# Patient Record
Sex: MALE | Race: OTHER | URBAN - METROPOLITAN AREA
[De-identification: names, ages, dates, MRNs, and addresses within clinical notes are randomized per-mention and may not be internally consistent; named-entity substitution may affect disease eponyms.]

---

## 2021-11-02 ENCOUNTER — INPATIENT (INPATIENT)
Facility: HOSPITAL | Age: 51
LOS: 0 days | DRG: 64 | End: 2021-11-03
Attending: PSYCHIATRY & NEUROLOGY | Admitting: PSYCHIATRY & NEUROLOGY
Payer: MEDICAID

## 2021-11-02 ENCOUNTER — EMERGENCY (EMERGENCY)
Facility: HOSPITAL | Age: 51
LOS: 1 days | Discharge: ANOTHER HOSPITAL | End: 2021-11-02
Attending: EMERGENCY MEDICINE
Payer: MEDICAID

## 2021-11-02 VITALS
DIASTOLIC BLOOD PRESSURE: 68 MMHG | WEIGHT: 178.13 LBS | SYSTOLIC BLOOD PRESSURE: 136 MMHG | RESPIRATION RATE: 18 BRPM | HEART RATE: 54 BPM | TEMPERATURE: 94 F | OXYGEN SATURATION: 100 %

## 2021-11-02 VITALS
OXYGEN SATURATION: 100 % | SYSTOLIC BLOOD PRESSURE: 134 MMHG | RESPIRATION RATE: 14 BRPM | DIASTOLIC BLOOD PRESSURE: 67 MMHG | HEART RATE: 56 BPM

## 2021-11-02 VITALS — WEIGHT: 171.96 LBS

## 2021-11-02 DIAGNOSIS — R56.9 UNSPECIFIED CONVULSIONS: ICD-10-CM

## 2021-11-02 LAB
ACETONE SERPL-MCNC: NEGATIVE — SIGNIFICANT CHANGE UP
ALBUMIN SERPL ELPH-MCNC: 3.6 G/DL — SIGNIFICANT CHANGE UP (ref 3.5–5)
ALP SERPL-CCNC: 789 U/L — HIGH (ref 40–120)
ALT FLD-CCNC: 19 U/L DA — SIGNIFICANT CHANGE UP (ref 10–60)
AMMONIA BLD-MCNC: 23 UMOL/L — SIGNIFICANT CHANGE UP (ref 11–32)
ANION GAP SERPL CALC-SCNC: 10 MMOL/L — SIGNIFICANT CHANGE UP (ref 5–17)
ANION GAP SERPL CALC-SCNC: 14 MMOL/L — SIGNIFICANT CHANGE UP (ref 5–17)
APTT BLD: 37.7 SEC — HIGH (ref 27.5–35.5)
APTT BLD: 41.2 SEC — HIGH (ref 27.5–35.5)
AST SERPL-CCNC: 8 U/L — LOW (ref 10–40)
BASE EXCESS BLDA CALC-SCNC: -0.4 MMOL/L — SIGNIFICANT CHANGE UP (ref -2–3)
BASOPHILS # BLD AUTO: 0.01 K/UL — SIGNIFICANT CHANGE UP (ref 0–0.2)
BASOPHILS NFR BLD AUTO: 0.2 % — SIGNIFICANT CHANGE UP (ref 0–2)
BILIRUB SERPL-MCNC: 1 MG/DL — SIGNIFICANT CHANGE UP (ref 0.2–1.2)
BLD GP AB SCN SERPL QL: NEGATIVE — SIGNIFICANT CHANGE UP
BUN SERPL-MCNC: 27 MG/DL — HIGH (ref 7–18)
BUN SERPL-MCNC: 32 MG/DL — HIGH (ref 7–23)
CALCIUM SERPL-MCNC: 9 MG/DL — SIGNIFICANT CHANGE UP (ref 8.4–10.5)
CALCIUM SERPL-MCNC: 9.3 MG/DL — SIGNIFICANT CHANGE UP (ref 8.4–10.5)
CHLORIDE SERPL-SCNC: 104 MMOL/L — SIGNIFICANT CHANGE UP (ref 96–108)
CHLORIDE SERPL-SCNC: 104 MMOL/L — SIGNIFICANT CHANGE UP (ref 96–108)
CHOLEST SERPL-MCNC: 153 MG/DL — SIGNIFICANT CHANGE UP
CK SERPL-CCNC: 74 U/L — SIGNIFICANT CHANGE UP (ref 35–232)
CO2 BLDA-SCNC: 24 MMOL/L — SIGNIFICANT CHANGE UP (ref 19–24)
CO2 SERPL-SCNC: 22 MMOL/L — SIGNIFICANT CHANGE UP (ref 22–31)
CO2 SERPL-SCNC: 26 MMOL/L — SIGNIFICANT CHANGE UP (ref 22–31)
CREAT SERPL-MCNC: 5.9 MG/DL — HIGH (ref 0.5–1.3)
CREAT SERPL-MCNC: 6.46 MG/DL — HIGH (ref 0.5–1.3)
EOSINOPHIL # BLD AUTO: 0.04 K/UL — SIGNIFICANT CHANGE UP (ref 0–0.5)
EOSINOPHIL NFR BLD AUTO: 0.9 % — SIGNIFICANT CHANGE UP (ref 0–6)
ETHANOL SERPL-MCNC: <3 MG/DL — SIGNIFICANT CHANGE UP (ref 0–10)
GAS PNL BLDA: SIGNIFICANT CHANGE UP
GLUCOSE SERPL-MCNC: 116 MG/DL — HIGH (ref 70–99)
GLUCOSE SERPL-MCNC: 166 MG/DL — HIGH (ref 70–99)
HCO3 BLDA-SCNC: 23 MMOL/L — SIGNIFICANT CHANGE UP (ref 21–28)
HCT VFR BLD CALC: 25.3 % — LOW (ref 39–50)
HCT VFR BLD CALC: 26.3 % — LOW (ref 39–50)
HDLC SERPL-MCNC: 45 MG/DL — SIGNIFICANT CHANGE UP
HGB BLD-MCNC: 8.1 G/DL — LOW (ref 13–17)
HGB BLD-MCNC: 8.5 G/DL — LOW (ref 13–17)
HOROWITZ INDEX BLDA+IHG-RTO: 50 — SIGNIFICANT CHANGE UP
IMM GRANULOCYTES NFR BLD AUTO: 0.2 % — SIGNIFICANT CHANGE UP (ref 0–1.5)
INR BLD: 1.12 RATIO — SIGNIFICANT CHANGE UP (ref 0.88–1.16)
INR BLD: 1.13 RATIO — SIGNIFICANT CHANGE UP (ref 0.88–1.16)
LACTATE SERPL-SCNC: 0.9 MMOL/L — SIGNIFICANT CHANGE UP (ref 0.7–2)
LIPID PNL WITH DIRECT LDL SERPL: 82 MG/DL — SIGNIFICANT CHANGE UP
LYMPHOCYTES # BLD AUTO: 1.02 K/UL — SIGNIFICANT CHANGE UP (ref 1–3.3)
LYMPHOCYTES # BLD AUTO: 23.6 % — SIGNIFICANT CHANGE UP (ref 13–44)
MAGNESIUM SERPL-MCNC: 2.2 MG/DL — SIGNIFICANT CHANGE UP (ref 1.6–2.6)
MAGNESIUM SERPL-MCNC: 2.3 MG/DL — SIGNIFICANT CHANGE UP (ref 1.6–2.6)
MCHC RBC-ENTMCNC: 31 PG — SIGNIFICANT CHANGE UP (ref 27–34)
MCHC RBC-ENTMCNC: 31.6 PG — SIGNIFICANT CHANGE UP (ref 27–34)
MCHC RBC-ENTMCNC: 32 GM/DL — SIGNIFICANT CHANGE UP (ref 32–36)
MCHC RBC-ENTMCNC: 32.3 GM/DL — SIGNIFICANT CHANGE UP (ref 32–36)
MCV RBC AUTO: 96.9 FL — SIGNIFICANT CHANGE UP (ref 80–100)
MCV RBC AUTO: 97.8 FL — SIGNIFICANT CHANGE UP (ref 80–100)
MONOCYTES # BLD AUTO: 0.46 K/UL — SIGNIFICANT CHANGE UP (ref 0–0.9)
MONOCYTES NFR BLD AUTO: 10.6 % — SIGNIFICANT CHANGE UP (ref 2–14)
NEUTROPHILS # BLD AUTO: 2.79 K/UL — SIGNIFICANT CHANGE UP (ref 1.8–7.4)
NEUTROPHILS NFR BLD AUTO: 64.5 % — SIGNIFICANT CHANGE UP (ref 43–77)
NON HDL CHOLESTEROL: 108 MG/DL — SIGNIFICANT CHANGE UP
NRBC # BLD: 0 /100 WBCS — SIGNIFICANT CHANGE UP (ref 0–0)
NRBC # BLD: 0 /100 WBCS — SIGNIFICANT CHANGE UP (ref 0–0)
PCO2 BLDA: 33 MMHG — LOW (ref 35–48)
PH BLDA: 7.45 — SIGNIFICANT CHANGE UP (ref 7.35–7.45)
PHOSPHATE SERPL-MCNC: 4.7 MG/DL — HIGH (ref 2.5–4.5)
PLATELET # BLD AUTO: 170 K/UL — SIGNIFICANT CHANGE UP (ref 150–400)
PLATELET # BLD AUTO: 187 K/UL — SIGNIFICANT CHANGE UP (ref 150–400)
PO2 BLDA: 121 MMHG — HIGH (ref 83–108)
POTASSIUM SERPL-MCNC: 3.7 MMOL/L — SIGNIFICANT CHANGE UP (ref 3.5–5.3)
POTASSIUM SERPL-MCNC: 4.6 MMOL/L — SIGNIFICANT CHANGE UP (ref 3.5–5.3)
POTASSIUM SERPL-SCNC: 3.7 MMOL/L — SIGNIFICANT CHANGE UP (ref 3.5–5.3)
POTASSIUM SERPL-SCNC: 4.6 MMOL/L — SIGNIFICANT CHANGE UP (ref 3.5–5.3)
PROT SERPL-MCNC: 8.3 G/DL — SIGNIFICANT CHANGE UP (ref 6–8.3)
PROTHROM AB SERPL-ACNC: 13.2 SEC — SIGNIFICANT CHANGE UP (ref 10.6–13.6)
PROTHROM AB SERPL-ACNC: 13.5 SEC — SIGNIFICANT CHANGE UP (ref 10.6–13.6)
RBC # BLD: 2.61 M/UL — LOW (ref 4.2–5.8)
RBC # BLD: 2.69 M/UL — LOW (ref 4.2–5.8)
RBC # FLD: 13.2 % — SIGNIFICANT CHANGE UP (ref 10.3–14.5)
RBC # FLD: 13.2 % — SIGNIFICANT CHANGE UP (ref 10.3–14.5)
RH IG SCN BLD-IMP: POSITIVE — SIGNIFICANT CHANGE UP
SAO2 % BLDA: 99.9 % — HIGH (ref 94–98)
SARS-COV-2 RNA SPEC QL NAA+PROBE: SIGNIFICANT CHANGE UP
SODIUM SERPL-SCNC: 140 MMOL/L — SIGNIFICANT CHANGE UP (ref 135–145)
SODIUM SERPL-SCNC: 140 MMOL/L — SIGNIFICANT CHANGE UP (ref 135–145)
TRIGL SERPL-MCNC: 132 MG/DL — SIGNIFICANT CHANGE UP
TROPONIN I, HIGH SENSITIVITY RESULT: 41.5 NG/L — SIGNIFICANT CHANGE UP
TROPONIN T, HIGH SENSITIVITY RESULT: 41 NG/L — SIGNIFICANT CHANGE UP (ref 0–51)
WBC # BLD: 4.33 K/UL — SIGNIFICANT CHANGE UP (ref 3.8–10.5)
WBC # BLD: 5.78 K/UL — SIGNIFICANT CHANGE UP (ref 3.8–10.5)
WBC # FLD AUTO: 4.33 K/UL — SIGNIFICANT CHANGE UP (ref 3.8–10.5)
WBC # FLD AUTO: 5.78 K/UL — SIGNIFICANT CHANGE UP (ref 3.8–10.5)

## 2021-11-02 PROCEDURE — 71045 X-RAY EXAM CHEST 1 VIEW: CPT

## 2021-11-02 PROCEDURE — 96374 THER/PROPH/DIAG INJ IV PUSH: CPT

## 2021-11-02 PROCEDURE — 82962 GLUCOSE BLOOD TEST: CPT

## 2021-11-02 PROCEDURE — 70450 CT HEAD/BRAIN W/O DYE: CPT | Mod: 26,MA

## 2021-11-02 PROCEDURE — 85025 COMPLETE CBC W/AUTO DIFF WBC: CPT

## 2021-11-02 PROCEDURE — 82550 ASSAY OF CK (CPK): CPT

## 2021-11-02 PROCEDURE — 70450 CT HEAD/BRAIN W/O DYE: CPT | Mod: 26,77

## 2021-11-02 PROCEDURE — 80307 DRUG TEST PRSMV CHEM ANLYZR: CPT

## 2021-11-02 PROCEDURE — 71045 X-RAY EXAM CHEST 1 VIEW: CPT | Mod: 26

## 2021-11-02 PROCEDURE — 87635 SARS-COV-2 COVID-19 AMP PRB: CPT

## 2021-11-02 PROCEDURE — 80061 LIPID PANEL: CPT

## 2021-11-02 PROCEDURE — 99291 CRITICAL CARE FIRST HOUR: CPT | Mod: 25

## 2021-11-02 PROCEDURE — 83735 ASSAY OF MAGNESIUM: CPT

## 2021-11-02 PROCEDURE — 70450 CT HEAD/BRAIN W/O DYE: CPT | Mod: MA

## 2021-11-02 PROCEDURE — 93005 ELECTROCARDIOGRAM TRACING: CPT

## 2021-11-02 PROCEDURE — 43752 NASAL/OROGASTRIC W/TUBE PLMT: CPT | Mod: 59

## 2021-11-02 PROCEDURE — 84484 ASSAY OF TROPONIN QUANT: CPT

## 2021-11-02 PROCEDURE — 96375 TX/PRO/DX INJ NEW DRUG ADDON: CPT

## 2021-11-02 PROCEDURE — 82140 ASSAY OF AMMONIA: CPT

## 2021-11-02 PROCEDURE — 36556 INSERT NON-TUNNEL CV CATH: CPT

## 2021-11-02 PROCEDURE — 36415 COLL VENOUS BLD VENIPUNCTURE: CPT

## 2021-11-02 PROCEDURE — 82009 KETONE BODYS QUAL: CPT

## 2021-11-02 PROCEDURE — 93010 ELECTROCARDIOGRAM REPORT: CPT

## 2021-11-02 PROCEDURE — 99291 CRITICAL CARE FIRST HOUR: CPT

## 2021-11-02 PROCEDURE — 31500 INSERT EMERGENCY AIRWAY: CPT

## 2021-11-02 PROCEDURE — 71045 X-RAY EXAM CHEST 1 VIEW: CPT | Mod: 26,77

## 2021-11-02 PROCEDURE — 85730 THROMBOPLASTIN TIME PARTIAL: CPT

## 2021-11-02 PROCEDURE — 83605 ASSAY OF LACTIC ACID: CPT

## 2021-11-02 PROCEDURE — 85610 PROTHROMBIN TIME: CPT

## 2021-11-02 PROCEDURE — 80053 COMPREHEN METABOLIC PANEL: CPT

## 2021-11-02 RX ORDER — HYDRALAZINE HCL 50 MG
5 TABLET ORAL ONCE
Refills: 0 | Status: COMPLETED | OUTPATIENT
Start: 2021-11-02 | End: 2021-11-02

## 2021-11-02 RX ORDER — CHLORHEXIDINE GLUCONATE 213 G/1000ML
15 SOLUTION TOPICAL EVERY 12 HOURS
Refills: 0 | Status: DISCONTINUED | OUTPATIENT
Start: 2021-11-02 | End: 2021-11-06

## 2021-11-02 RX ORDER — CHLORHEXIDINE GLUCONATE 213 G/1000ML
1 SOLUTION TOPICAL
Refills: 0 | Status: DISCONTINUED | OUTPATIENT
Start: 2021-11-02 | End: 2021-11-03

## 2021-11-02 RX ORDER — NICARDIPINE HYDROCHLORIDE 30 MG/1
5 CAPSULE, EXTENDED RELEASE ORAL
Qty: 40 | Refills: 0 | Status: DISCONTINUED | OUTPATIENT
Start: 2021-11-02 | End: 2021-11-06

## 2021-11-02 RX ORDER — LEVETIRACETAM 250 MG/1
1000 TABLET, FILM COATED ORAL ONCE
Refills: 0 | Status: COMPLETED | OUTPATIENT
Start: 2021-11-02 | End: 2021-11-02

## 2021-11-02 RX ORDER — LEVETIRACETAM 250 MG/1
250 TABLET, FILM COATED ORAL EVERY 12 HOURS
Refills: 0 | Status: DISCONTINUED | OUTPATIENT
Start: 2021-11-02 | End: 2021-11-03

## 2021-11-02 RX ORDER — SODIUM CHLORIDE 9 MG/ML
30 INJECTION INTRAMUSCULAR; INTRAVENOUS; SUBCUTANEOUS ONCE
Refills: 0 | Status: COMPLETED | OUTPATIENT
Start: 2021-11-02 | End: 2021-11-02

## 2021-11-02 RX ORDER — NICARDIPINE HYDROCHLORIDE 30 MG/1
15 CAPSULE, EXTENDED RELEASE ORAL
Qty: 40 | Refills: 0 | Status: DISCONTINUED | OUTPATIENT
Start: 2021-11-02 | End: 2021-11-03

## 2021-11-02 RX ORDER — PANTOPRAZOLE SODIUM 20 MG/1
40 TABLET, DELAYED RELEASE ORAL ONCE
Refills: 0 | Status: COMPLETED | OUTPATIENT
Start: 2021-11-02 | End: 2021-11-02

## 2021-11-02 RX ORDER — MIDAZOLAM HYDROCHLORIDE 1 MG/ML
2 INJECTION, SOLUTION INTRAMUSCULAR; INTRAVENOUS ONCE
Refills: 0 | Status: DISCONTINUED | OUTPATIENT
Start: 2021-11-02 | End: 2021-11-02

## 2021-11-02 RX ORDER — SODIUM CHLORIDE 9 MG/ML
10 INJECTION INTRAMUSCULAR; INTRAVENOUS; SUBCUTANEOUS
Refills: 0 | Status: DISCONTINUED | OUTPATIENT
Start: 2021-11-02 | End: 2021-11-03

## 2021-11-02 RX ORDER — ETOMIDATE 2 MG/ML
10 INJECTION INTRAVENOUS ONCE
Refills: 0 | Status: COMPLETED | OUTPATIENT
Start: 2021-11-02 | End: 2021-11-02

## 2021-11-02 RX ORDER — PROPOFOL 10 MG/ML
5 INJECTION, EMULSION INTRAVENOUS
Qty: 500 | Refills: 0 | Status: DISCONTINUED | OUTPATIENT
Start: 2021-11-02 | End: 2021-11-06

## 2021-11-02 RX ORDER — SENNA PLUS 8.6 MG/1
2 TABLET ORAL AT BEDTIME
Refills: 0 | Status: DISCONTINUED | OUTPATIENT
Start: 2021-11-02 | End: 2021-11-03

## 2021-11-02 RX ORDER — PANTOPRAZOLE SODIUM 20 MG/1
40 TABLET, DELAYED RELEASE ORAL DAILY
Refills: 0 | Status: DISCONTINUED | OUTPATIENT
Start: 2021-11-02 | End: 2021-11-03

## 2021-11-02 RX ORDER — CHLORHEXIDINE GLUCONATE 213 G/1000ML
15 SOLUTION TOPICAL EVERY 12 HOURS
Refills: 0 | Status: DISCONTINUED | OUTPATIENT
Start: 2021-11-02 | End: 2021-11-03

## 2021-11-02 RX ADMIN — PANTOPRAZOLE SODIUM 40 MILLIGRAM(S): 20 TABLET, DELAYED RELEASE ORAL at 17:56

## 2021-11-02 RX ADMIN — MIDAZOLAM HYDROCHLORIDE 2 MILLIGRAM(S): 1 INJECTION, SOLUTION INTRAMUSCULAR; INTRAVENOUS at 13:28

## 2021-11-02 RX ADMIN — PROPOFOL 2.34 MICROGRAM(S)/KG/MIN: 10 INJECTION, EMULSION INTRAVENOUS at 15:06

## 2021-11-02 RX ADMIN — Medication 2 MILLIGRAM(S): at 12:53

## 2021-11-02 RX ADMIN — NICARDIPINE HYDROCHLORIDE 75 MG/HR: 30 CAPSULE, EXTENDED RELEASE ORAL at 20:03

## 2021-11-02 RX ADMIN — SODIUM CHLORIDE 30 MILLILITER(S): 9 INJECTION INTRAMUSCULAR; INTRAVENOUS; SUBCUTANEOUS at 16:58

## 2021-11-02 RX ADMIN — Medication 5 MILLIGRAM(S): at 16:20

## 2021-11-02 RX ADMIN — NICARDIPINE HYDROCHLORIDE 75 MG/HR: 30 CAPSULE, EXTENDED RELEASE ORAL at 17:57

## 2021-11-02 RX ADMIN — SODIUM CHLORIDE 30 MILLILITER(S): 9 INJECTION INTRAMUSCULAR; INTRAVENOUS; SUBCUTANEOUS at 16:04

## 2021-11-02 RX ADMIN — PANTOPRAZOLE SODIUM 40 MILLIGRAM(S): 20 TABLET, DELAYED RELEASE ORAL at 13:28

## 2021-11-02 RX ADMIN — LEVETIRACETAM 400 MILLIGRAM(S): 250 TABLET, FILM COATED ORAL at 18:29

## 2021-11-02 RX ADMIN — NICARDIPINE HYDROCHLORIDE 25 MG/HR: 30 CAPSULE, EXTENDED RELEASE ORAL at 13:25

## 2021-11-02 RX ADMIN — ETOMIDATE 10 MILLIGRAM(S): 2 INJECTION INTRAVENOUS at 13:28

## 2021-11-02 RX ADMIN — CHLORHEXIDINE GLUCONATE 15 MILLILITER(S): 213 SOLUTION TOPICAL at 17:56

## 2021-11-02 RX ADMIN — LEVETIRACETAM 400 MILLIGRAM(S): 250 TABLET, FILM COATED ORAL at 14:13

## 2021-11-02 NOTE — ED PROVIDER NOTE - CARE PLAN
Principal Discharge DX:	ICH (intracerebral hemorrhage)  Secondary Diagnosis:	Seizure  Secondary Diagnosis:	Malignant hypertension   1

## 2021-11-02 NOTE — ED PROVIDER NOTE - PROGRESS NOTE DETAILS
Case d/w Dr. Ferguson, neuro intensivist @ [facility], will transfer Case also d/w pt's brother Lazaro [phone] pt with ICH, will transfer pt.

## 2021-11-02 NOTE — PROGRESS NOTE ADULT - SUBJECTIVE AND OBJECTIVE BOX
EVENTS:   No acute events overnight.    VITALS:  T(C): , Max: 37.4 (11-02-21 @ 19:00)  HR:  (49 - 86)  BP:  (131/64 - 201/98)  ABP:  (118/44 - 156/61)  RR:  (14 - 28)  SpO2:  (99% - 100%)  Wt(kg): --  Device: Avea, Mode: AC/ CMV (Assist Control/ Continuous Mandatory Ventilation), RR (machine): 28, TV (machine): 450, FiO2: 40, PEEP: 5, ITime: 1, MAP: 11, PIP: 17    11-02-21 @ 07:01  -  11-02-21 @ 19:57  --------------------------------------------------------  IN: 75 mL / OUT: 0 mL / NET: 75 mL      LABS:  Na: 140 (11-02 @ 17:00), 140 (11-02 @ 12:47)  K: 4.6 (11-02 @ 17:00), 3.7 (11-02 @ 12:47)  Cl: 104 (11-02 @ 17:00), 104 (11-02 @ 12:47)  CO2: 22 (11-02 @ 17:00), 26 (11-02 @ 12:47)  BUN: 32 (11-02 @ 17:00), 27 (11-02 @ 12:47)  Cr: 6.46 (11-02 @ 17:00), 5.90 (11-02 @ 12:47)  Glu: 166(11-02 @ 17:00), 116(11-02 @ 12:47)    Hgb: 8.1 (11-02 @ 17:00), 8.5 (11-02 @ 12:47)  Hct: 25.3 (11-02 @ 17:00), 26.3 (11-02 @ 12:47)  WBC: 5.78 (11-02 @ 17:00), 4.33 (11-02 @ 12:47)  Plt: 187 (11-02 @ 17:00), 170 (11-02 @ 12:47)    INR: 1.13 11-02-21 @ 17:00, 1.12 11-02-21 @ 12:47  PTT: 37.7 11-02-21 @ 17:00, 41.2 11-02-21 @ 12:47    MEDICATIONS:  chlorhexidine 0.12% Liquid 15 milliLiter(s) Oral Mucosa every 12 hours  chlorhexidine 4% Liquid 1 Application(s) Topical <User Schedule>  levETIRAcetam  IVPB 250 milliGRAM(s) IV Intermittent every 12 hours  niCARdipine Infusion 15 mG/Hr IV Continuous <Continuous>  pantoprazole  Injectable 40 milliGRAM(s) IV Push daily  senna 2 Tablet(s) Oral at bedtime PRN  sodium chloride 0.9% lock flush 10 milliLiter(s) IV Push every 1 hour PRN    EXAMINATION:  General:  calm  HEENT:  MMM  Neuro:  awake, alert, oriented x 3, follows commands, moves all extremities  Cards:  RRR  Respiratory:  no respiratory distress  Abdomen:  soft  Extremities:  no edema    Assessment/Plan:   51 year-old man with history of hypertension and end stage renal disease on hemodialysis was noted to have decrease in mental status and left sided weakness around noon on 11/2/21 whilst at dialysis. He was initially conversant with EMS, but became non-verbal upon arrival to [place] ED where his BP was noted to be 201/86mmhg. He was taken to CT which revealed large right basal ganglia and thalamic hemorrhages with mass effect. He was noted to have a short generalized tonic clonic seizure treated with 2mg lorazepam. He was intubated for airway protection, but was reportedly "fighting" requiring sedation with propofol. He was started on nicardipine gtt for blood pressure control. His reported pre-intubation examination per ED MD was "unresponsive, pupils small but reactive, left sided weakness."  likely hypertensive right basal ganglia/thalamic hemorrhage with symptomatic brain compression/herniation   EVENTS:   I had an extensive C discussion with patient's family.    VITALS:  T(C): , Max: 37.4 (11-02-21 @ 19:00)  HR:  (49 - 86)  BP:  (131/64 - 201/98)  ABP:  (118/44 - 156/61)  RR:  (14 - 28)  SpO2:  (99% - 100%)  Wt(kg): --  Device: Avea, Mode: AC/ CMV (Assist Control/ Continuous Mandatory Ventilation), RR (machine): 28, TV (machine): 450, FiO2: 40, PEEP: 5, ITime: 1, MAP: 11, PIP: 17    11-02-21 @ 07:01  -  11-02-21 @ 19:57  --------------------------------------------------------  IN: 75 mL / OUT: 0 mL / NET: 75 mL      LABS:  Na: 140 (11-02 @ 17:00), 140 (11-02 @ 12:47)  K: 4.6 (11-02 @ 17:00), 3.7 (11-02 @ 12:47)  Cl: 104 (11-02 @ 17:00), 104 (11-02 @ 12:47)  CO2: 22 (11-02 @ 17:00), 26 (11-02 @ 12:47)  BUN: 32 (11-02 @ 17:00), 27 (11-02 @ 12:47)  Cr: 6.46 (11-02 @ 17:00), 5.90 (11-02 @ 12:47)  Glu: 166(11-02 @ 17:00), 116(11-02 @ 12:47)    Hgb: 8.1 (11-02 @ 17:00), 8.5 (11-02 @ 12:47)  Hct: 25.3 (11-02 @ 17:00), 26.3 (11-02 @ 12:47)  WBC: 5.78 (11-02 @ 17:00), 4.33 (11-02 @ 12:47)  Plt: 187 (11-02 @ 17:00), 170 (11-02 @ 12:47)    INR: 1.13 11-02-21 @ 17:00, 1.12 11-02-21 @ 12:47  PTT: 37.7 11-02-21 @ 17:00, 41.2 11-02-21 @ 12:47    MEDICATIONS:  chlorhexidine 0.12% Liquid 15 milliLiter(s) Oral Mucosa every 12 hours  chlorhexidine 4% Liquid 1 Application(s) Topical <User Schedule>  levETIRAcetam  IVPB 250 milliGRAM(s) IV Intermittent every 12 hours  niCARdipine Infusion 15 mG/Hr IV Continuous <Continuous>  pantoprazole  Injectable 40 milliGRAM(s) IV Push daily  senna 2 Tablet(s) Oral at bedtime PRN  sodium chloride 0.9% lock flush 10 milliLiter(s) IV Push every 1 hour PRN    EXAMINATION:  General:  calm  HEENT:  MMM  Neuro:  awake, alert, oriented x 3, follows commands, moves all extremities  Cards:  RRR  Respiratory:  no respiratory distress  Abdomen:  soft  Extremities:  no edema    Assessment/Plan:   51 year-old man with history of hypertension and end stage renal disease on hemodialysis was noted to have decrease in mental status and left sided weakness around noon on 11/2/21 whilst at dialysis. He was initially conversant with EMS, but became non-verbal upon arrival to [place] ED where his BP was noted to be 201/86mmhg. He was taken to CT which revealed large right basal ganglia and thalamic hemorrhages with mass effect. He was noted to have a short generalized tonic clonic seizure treated with 2mg lorazepam. He was intubated for airway protection, but was reportedly "fighting" requiring sedation with propofol. He was started on nicardipine gtt for blood pressure control. His reported pre-intubation examination per ED MD was "unresponsive, pupils small but reactive, left sided weakness."  likely hypertensive right basal ganglia/thalamic hemorrhage with symptomatic brain compression/herniation   EVENTS:   I had an extensive [facility] discussion with patient's family including both daughters, patient's brother, and other members of the extended family (Albanian interpreters used [id number]). We discussed the devastating nature of patient's neurologic injury and poor prognosis, I showed them the imaging as well. Patient had fixed dilated pupils and no corneals and I explained that he may progress to brain death and his heart may stop at any time. I explained that patient's injury is not survivable and family elected to proceed with comfort care and compassionate extubation.     VITALS:  T(C): , Max: 37.4 (11-02-21 @ 19:00)  HR:  (49 - 86)  BP:  (131/64 - 201/98)  ABP:  (118/44 - 156/61)  RR:  (14 - 28)  SpO2:  (99% - 100%)  Wt(kg): --  Device: Avea, Mode: AC/ CMV (Assist Control/ Continuous Mandatory Ventilation), RR (machine): 28, TV (machine): 450, FiO2: 40, PEEP: 5, ITime: 1, MAP: 11, PIP: 17    11-02-21 @ 07:01  -  11-02-21 @ 19:57  --------------------------------------------------------  IN: 75 mL / OUT: 0 mL / NET: 75 mL      LABS:  Na: 140 (11-02 @ 17:00), 140 (11-02 @ 12:47)  K: 4.6 (11-02 @ 17:00), 3.7 (11-02 @ 12:47)  Cl: 104 (11-02 @ 17:00), 104 (11-02 @ 12:47)  CO2: 22 (11-02 @ 17:00), 26 (11-02 @ 12:47)  BUN: 32 (11-02 @ 17:00), 27 (11-02 @ 12:47)  Cr: 6.46 (11-02 @ 17:00), 5.90 (11-02 @ 12:47)  Glu: 166(11-02 @ 17:00), 116(11-02 @ 12:47)    Hgb: 8.1 (11-02 @ 17:00), 8.5 (11-02 @ 12:47)  Hct: 25.3 (11-02 @ 17:00), 26.3 (11-02 @ 12:47)  WBC: 5.78 (11-02 @ 17:00), 4.33 (11-02 @ 12:47)  Plt: 187 (11-02 @ 17:00), 170 (11-02 @ 12:47)    INR: 1.13 11-02-21 @ 17:00, 1.12 11-02-21 @ 12:47  PTT: 37.7 11-02-21 @ 17:00, 41.2 11-02-21 @ 12:47    MEDICATIONS:  chlorhexidine 0.12% Liquid 15 milliLiter(s) Oral Mucosa every 12 hours  chlorhexidine 4% Liquid 1 Application(s) Topical <User Schedule>  levETIRAcetam  IVPB 250 milliGRAM(s) IV Intermittent every 12 hours  niCARdipine Infusion 15 mG/Hr IV Continuous <Continuous>  pantoprazole  Injectable 40 milliGRAM(s) IV Push daily  senna 2 Tablet(s) Oral at bedtime PRN  sodium chloride 0.9% lock flush 10 milliLiter(s) IV Push every 1 hour PRN    EXAMINATION:  General:  calm  HEENT:  MMM  Neuro:  comatose, fixed dilate pupils, no corneals, initially + cough (which patient lost later in the night), no movement to noxious  Cards:  RRR  Respiratory:  no respiratory distress  Abdomen:  soft  Extremities:  no edema    Assessment/Plan:   52 yo man with h/o HTN and ESRD admitted with hypertensive right basal ganglia/thalamic hemorrhage with symptomatic brain compression/herniation. Neurosurgery did not offer any intervention. Due to the devastating non-survivable nature of the injury, family elected to proceed with comfort care and compassionate extubation.    Comfort care order set  Compassionate extubation    Danielle Kenny  Neurocritical Care Attending     EVENTS:   I had an extensive [facility] discussion with patient's family including both daughters, patient's brother, and other members of the extended family (Syriac interpreters used [id number]). We discussed the devastating nature of patient's neurologic injury and poor prognosis, I showed them the imaging as well. Patient had fixed dilated pupils and no corneals and I explained that he may progress to brain death and his heart may stop at any time. I explained that patient's injury is not survivable and family elected to proceed with comfort care and compassionate extubation.     VITALS:  T(C): , Max: 37.4 (11-02-21 @ 19:00)  HR:  (49 - 86)  BP:  (131/64 - 201/98)  ABP:  (118/44 - 156/61)  RR:  (14 - 28)  SpO2:  (99% - 100%)  Wt(kg): --  Device: Avea, Mode: AC/ CMV (Assist Control/ Continuous Mandatory Ventilation), RR (machine): 28, TV (machine): 450, FiO2: 40, PEEP: 5, ITime: 1, MAP: 11, PIP: 17    11-02-21 @ 07:01  -  11-02-21 @ 19:57  --------------------------------------------------------  IN: 75 mL / OUT: 0 mL / NET: 75 mL      LABS:  Na: 140 (11-02 @ 17:00), 140 (11-02 @ 12:47)  K: 4.6 (11-02 @ 17:00), 3.7 (11-02 @ 12:47)  Cl: 104 (11-02 @ 17:00), 104 (11-02 @ 12:47)  CO2: 22 (11-02 @ 17:00), 26 (11-02 @ 12:47)  BUN: 32 (11-02 @ 17:00), 27 (11-02 @ 12:47)  Cr: 6.46 (11-02 @ 17:00), 5.90 (11-02 @ 12:47)  Glu: 166(11-02 @ 17:00), 116(11-02 @ 12:47)    Hgb: 8.1 (11-02 @ 17:00), 8.5 (11-02 @ 12:47)  Hct: 25.3 (11-02 @ 17:00), 26.3 (11-02 @ 12:47)  WBC: 5.78 (11-02 @ 17:00), 4.33 (11-02 @ 12:47)  Plt: 187 (11-02 @ 17:00), 170 (11-02 @ 12:47)    INR: 1.13 11-02-21 @ 17:00, 1.12 11-02-21 @ 12:47  PTT: 37.7 11-02-21 @ 17:00, 41.2 11-02-21 @ 12:47    MEDICATIONS:  chlorhexidine 0.12% Liquid 15 milliLiter(s) Oral Mucosa every 12 hours  chlorhexidine 4% Liquid 1 Application(s) Topical <User Schedule>  levETIRAcetam  IVPB 250 milliGRAM(s) IV Intermittent every 12 hours  niCARdipine Infusion 15 mG/Hr IV Continuous <Continuous>  pantoprazole  Injectable 40 milliGRAM(s) IV Push daily  senna 2 Tablet(s) Oral at bedtime PRN  sodium chloride 0.9% lock flush 10 milliLiter(s) IV Push every 1 hour PRN    EXAMINATION:  General:  calm  HEENT:  MMM  Neuro:  comatose, fixed dilate pupils, no corneals, initially + cough (which patient lost later in the night), no movement to noxious  Cards:  RRR  Respiratory:  no respiratory distress  Abdomen:  soft  Extremities:  no edema    Assessment/Plan:   52 yo man with h/o HTN and ESRD admitted with hypertensive right basal ganglia/thalamic hemorrhage with symptomatic brain compression/herniation. Neurosurgery did not offer any intervention. Due to the devastating non-survivable nature of the injury, family elected to proceed with comfort care and compassionate extubation.    Comfort care order set  Compassionate extubation    Danielle Kenny  Neurocritical Care Attending    Patient seen and examined by attending on 10/31/2021.    Patient is critically ill due to extensive ICH and at high risk for neurological deterioration or death due to: herniation, respiratory failure       EVENTS:   I had an extensive [facility] discussion with patient's family including both daughters, patient's brother, and other members of the extended family (Amharic interpreters used [id number]). We discussed the devastating nature of patient's neurologic injury and poor prognosis, I showed them the imaging as well. Patient had fixed dilated pupils and no corneals and I explained that he may progress to brain death and his heart may stop at any time. I explained that patient's injury is not survivable and family elected to proceed with comfort care and compassionate extubation.     VITALS:  T(C): , Max: 37.4 (11-02-21 @ 19:00)  HR:  (49 - 86)  BP:  (131/64 - 201/98)  ABP:  (118/44 - 156/61)  RR:  (14 - 28)  SpO2:  (99% - 100%)  Wt(kg): --  Device: Avea, Mode: AC/ CMV (Assist Control/ Continuous Mandatory Ventilation), RR (machine): 28, TV (machine): 450, FiO2: 40, PEEP: 5, ITime: 1, MAP: 11, PIP: 17    11-02-21 @ 07:01  -  11-02-21 @ 19:57  --------------------------------------------------------  IN: 75 mL / OUT: 0 mL / NET: 75 mL      LABS:  Na: 140 (11-02 @ 17:00), 140 (11-02 @ 12:47)  K: 4.6 (11-02 @ 17:00), 3.7 (11-02 @ 12:47)  Cl: 104 (11-02 @ 17:00), 104 (11-02 @ 12:47)  CO2: 22 (11-02 @ 17:00), 26 (11-02 @ 12:47)  BUN: 32 (11-02 @ 17:00), 27 (11-02 @ 12:47)  Cr: 6.46 (11-02 @ 17:00), 5.90 (11-02 @ 12:47)  Glu: 166(11-02 @ 17:00), 116(11-02 @ 12:47)    Hgb: 8.1 (11-02 @ 17:00), 8.5 (11-02 @ 12:47)  Hct: 25.3 (11-02 @ 17:00), 26.3 (11-02 @ 12:47)  WBC: 5.78 (11-02 @ 17:00), 4.33 (11-02 @ 12:47)  Plt: 187 (11-02 @ 17:00), 170 (11-02 @ 12:47)    INR: 1.13 11-02-21 @ 17:00, 1.12 11-02-21 @ 12:47  PTT: 37.7 11-02-21 @ 17:00, 41.2 11-02-21 @ 12:47    MEDICATIONS:  chlorhexidine 0.12% Liquid 15 milliLiter(s) Oral Mucosa every 12 hours  chlorhexidine 4% Liquid 1 Application(s) Topical <User Schedule>  levETIRAcetam  IVPB 250 milliGRAM(s) IV Intermittent every 12 hours  niCARdipine Infusion 15 mG/Hr IV Continuous <Continuous>  pantoprazole  Injectable 40 milliGRAM(s) IV Push daily  senna 2 Tablet(s) Oral at bedtime PRN  sodium chloride 0.9% lock flush 10 milliLiter(s) IV Push every 1 hour PRN    EXAMINATION:  General:  calm  HEENT:  MMM  Neuro:  comatose, fixed dilate pupils, no corneals, initially + cough (which patient lost later in the night), no movement to noxious  Cards:  RRR  Respiratory:  no respiratory distress  Abdomen:  soft  Extremities:  no edema    Assessment/Plan:   50 yo man with h/o HTN and ESRD admitted with hypertensive right basal ganglia/thalamic hemorrhage with symptomatic brain compression/herniation. Neurosurgery did not offer any intervention. Due to the devastating non-survivable nature of the injury, family elected to proceed with comfort care and compassionate extubation.    Comfort care order set  Compassionate extubation    Danielle Kenny  Neurocritical Care Attending    Patient seen and examined by attending on 11/2/2021.    Patient is critically ill due to extensive ICH and at high risk for neurological deterioration or death due to: herniation, respiratory failure

## 2021-11-02 NOTE — ED ADULT NURSE NOTE - OBJECTIVE STATEMENT
BIBA as notification for slurred speech with right facial droop and left arm weakness, noted today at dialysis center around 1150Am. Patient arrived lethargic, responsive to pain

## 2021-11-02 NOTE — PROCEDURE NOTE - NSINDICATIONS_GEN_A_CORE
arterial puncture to obtain ABG's/blood sampling/critical patient/monitoring purposes
critical illness/emergency venous access/hypertonic/irritant infusion/venous access

## 2021-11-02 NOTE — ED PROVIDER NOTE - CLINICAL SUMMARY MEDICAL DECISION MAKING FREE TEXT BOX
Patient noted with seizure in the ED. With left sided weakness, concern for CVA. However, complicated with seizures, not TPA candidate. Activated code stroke. Discuss with telestroke physician. Patient noted with seizure in the ED. With left sided weakness, concern for CVA/ICH. However, complicated with seizures, not TPA candidate. Activated code stroke. Discuss with telestroke physician.

## 2021-11-02 NOTE — ED ADULT TRIAGE NOTE - NS ED TRIAGE AVPU SCALE
Patient called stating she was seen yesterday by Dr. Castillo and a blood pressure medication was going to be called in for her to [facility] but nothing is on med list as being ordered and there is nothing at pharmacy.  Please call in for her.  Any questions, she can be reached at [phone].   Painful - The patient does not respond to voice, but does respond to a painful stimulus, such as a squeeze to the hand or sternal rub. A noxious stimulous is needed to elicit a response.

## 2021-11-02 NOTE — CONSULT NOTE ADULT - ASSESSMENT
LESLI COSTA  51M hx ESRD on dialysis, HTN xfer OSH. Dec mental status and L sided weakness ~12pm 11/2/21 at dialysis. Became nonverbal at OSH, /86. CTH: Large R BG/thalamic heme w/ mass effect. Short GTC sz tx tx lorazepam. Intubated for airway. Started on nicardipine gtt. Exam: Intubated, fixed/dilated pupils b/l, +cough/gag, no dolls, no corneals, BU nothing, TF BLE.  -Exam not improved with 23.4% hypertonic saline  -No mannitol b/c pt anuric  -Discussed with neurosurgery attending  -No neurosurgical intervention  -Palliative care and GOC w/ family once reachable  -Care per NSCU  -GCS 4T, ICH score 3 LESLI COSTA  51M hx ESRD on dialysis, HTN xfer OSH. Dec mental status and L sided weakness ~12pm 11/2/21 at dialysis. Became nonverbal at OSH, /86. CTH: Large R BG/thalamic heme w/ mass effect. Short GTC sz tx tx lorazepam. Intubated for airway. Started on nicardipine gtt. Exam: Intubated, fixed/dilated pupils b/l, +cough/gag, no dolls, no corneals, BU nothing, TF BLE.  -Exam not improved with 23.4% hypertonic saline  -No mannitol b/c pt anuric  -Cr 5.9, temp 94, plt WNL, PTT 41.2, PT/INR WNL  -Discussed with neurosurgery attending  -No neurosurgical intervention  -Palliative care and GOC w/ family once reachable  -Care per NSCU  -GCS 4T, ICH score 3

## 2021-11-02 NOTE — CHART NOTE - NSCHARTNOTEFT_GEN_A_CORE
CAPRINI SCORE [CLOT] Score on Admission for     AGE RELATED RISK FACTORS                                                       MOBILITY RELATED FACTORS  [x ] Age 41-60 years                                            (1 Point)                  [x ] Bed rest                                                        (1 Point)  [ ] Age: 61-74 years                                           (2 Points)                 [ ] Plaster cast                                                   (2 Points)  [ ] Age= 75 years                                              (3 Points)                 [ ] Bed bound for more than 72 hours                 (2 Points)    DISEASE RELATED RISK FACTORS                                               GENDER SPECIFIC FACTORS  [ ] Edema in the lower extremities                       (1 Point)                  [ ] Pregnancy                                                     (1 Point)  [x ] Varicose veins                                               (1 Point)                  [ ] Post-partum < 6 weeks                                   (1 Point)             [ ] BMI > 25 Kg/m2                                            (1 Point)                  [ ] Hormonal therapy  or oral contraception          (1 Point)                 [ ] Sepsis (in the previous month)                        (1 Point)                  [ ] History of pregnancy complications                 (1 point)  [ ] Pneumonia or serious lung disease                                               [ ] Unexplained or recurrent                     (1 Point)           (in the previous month)                               (1 Point)  [ ] Abnormal pulmonary function test                     (1 Point)                 SURGERY RELATED RISK FACTORS (include planned surgeries)  [ ] Acute myocardial infarction                              (1 Point)                 [ ]  Section                                             (1 Point)  [ ] Congestive heart failure (in the previous month)  (1 Point)         [ ] Minor surgery                                                  (1 Point)   [ ] Inflammatory bowel disease                             (1 Point)                 [ ] Arthroscopic surgery                                        (2 Points)  [ ] Central venous access                                      (2 Points)                [ ] General surgery lasting more than 45 minutes   (2 Points)       [ x] Stroke (in the previous month)                          (5 Points)               [ ] Elective arthroplasty                                         (5 Points)            [ ] current or past malignancy                              (2 Points)                                                                                                       HEMATOLOGY RELATED FACTORS                                                 TRAUMA RELATED RISK FACTORS  [ ] Prior episodes of VTE                                     (3 Points)                [ ] Fracture of the hip, pelvis, or leg                       (5 Points)  [ ] Positive family history for VTE                         (3 Points)                 [ ] Acute spinal cord injury (in the previous month)  (5 Points)  [ ] Prothrombin 55510 A                                     (3 Points)                 [ ] Paralysis  (less than 1 month)                             (5 Points)  [ ] Factor V Leiden                                             (3 Points)                  [ ] Multiple Trauma within 1 month                        (5 Points)  [ ] Lupus anticoagulants                                     (3 Points)                                                           [ ] Anticardiolipin antibodies                               (3 Points)                                                       [ ] High homocysteine in the blood                      (3 Points)                                             [ ] Other congenital or acquired thrombophilia      (3 Points)                                                [ ] Heparin induced thrombocytopenia                  (3 Points)                                          Total Score [   8       ]    Risk:  Very low 0   Low 1 to 2   Moderate 3 to 4   High =5       VTE Prophylasix Recommednations:  [ x] mechanical pneumatic compression devices                                      [ ] contraindicated: _____________________  [ ] chemo prophylasix                                                                                   [x ] contraindicated _____________________    **** HIGH LIKELIHOOD DVT PRESENT ON ADMISSION  [ ] (please order LE dopplers within 24 hours of admission)

## 2021-11-02 NOTE — DISCHARGE NOTE NURSING/CASE MANAGEMENT/SOCIAL WORK - PATIENT PORTAL LINK FT
You can access the FollowMyHealth Patient Portal offered by [facility] by registering at the following website: [email or web address]. By joining [facility]’s FollowMyHealth portal, you will also be able to view your health information using other applications (apps) compatible with our system.

## 2021-11-02 NOTE — PROCEDURE NOTE - NSPROCDETAILS_GEN_ALL_CORE
location identified, draped/prepped, sterile technique used, needle inserted/introduced/positive blood return obtained via catheter/connected to a pressurized flush line/Seldinger technique/all materials/supplies accounted for at end of procedure
guidewire recovered/lumen(s) aspirated and flushed/sterile dressing applied/sterile technique, catheter placed/ultrasound guidance with use of sterile gel and probe cove

## 2021-11-02 NOTE — PROCEDURE NOTE - NSPOSTCAREGUIDE_GEN_A_CORE
Verbal/written post procedure instructions were given to patient/caregiver/Instructed patient/caregiver to follow-up with primary care physician/Instructed patient/caregiver regarding signs and symptoms of infection/Keep the cast/splint/dressing clean and dry/Care for catheter as per unit/ICU protocols
Keep the cast/splint/dressing clean and dry/Care for catheter as per unit/ICU protocols

## 2021-11-02 NOTE — PROGRESS NOTE ADULT - ASSESSMENT
ASSESSMENT/PLAN: likely hypertensive right basal ganglia/thalamic hemorrhage with symptomatic brain compression/herniation  - ICP management: head of bed elevated to 30 degrees, head neutral, hyperventilated but check ABG as excess hyperventilation will result in cerebral ischemia, 23.4% as tolerated from respiratory standpoint given renal failure; NSGY declined operative management  - CT Head portable STAT  - Monitor for progression to brain death  - EEG r/o subclinical seizures as cause of mental status decline, though favor structural/herniation  - -160mmHg  - Vent support for acute resp failure  - Renal eval for CVVH pending goals of care  - Family discussion for goals of care ASSESSMENT/PLAN: likely hypertensive right basal ganglia/thalamic hemorrhage with symptomatic brain compression/herniation  - ICP management: head of bed elevated to 30 degrees, head neutral, hyperventilated but check ABG as excess hyperventilation will result in cerebral ischemia, 23.4% as tolerated from respiratory standpoint given renal failure; NSGY declined operative management  - CT Head portable STAT  - Monitor for progression to brain death  - EEG r/o subclinical seizures as cause of mental status decline, though favor structural/herniation  - -160mmHg  - Vent support for acute resp failure  - Renal eval for CVVH pending goals of care; at present, does not require renal replacement therapy; monitor lytes and fluid status  - Family discussion for goals of care

## 2021-11-02 NOTE — CONSULT NOTE ADULT - SUBJECTIVE AND OBJECTIVE BOX
p ([id number])     HPI:  51 year-old man with history of hypertension and end stage renal disease on hemodialysis was noted to have decrease in mental status and left sided weakness around noon on 11/2/21 whilst at dialysis. He was initially conversant with EMS, but became non-verbal upon arrival to [place] ED where his BP was noted to be 201/86mmhg. He was taken to CT which revealed large right basal ganglia and thalamic hemorrhages with mass effect. He was noted to have a short generalized tonic clonic seizure treated with 2mg lorazepam. He was intubated for airway protection, but was reportedly "fighting" requiring sedation with propofol. He was started on nicardipine gtt for blood pressure control. His reported pre-intubation examination per ED MD was "unresponsive, pupils small but reactive, left sided weakness."    Exam:  Intubated, fixed/dilated pupils b/l, +cough/gag, no dolls, no corneals, BU nothing, TF BLE    --Anticoagulation:    =====================  PAST MEDICAL HISTORY   HTN (hypertension)    Chronic kidney disease, unspecified CKD stage      PAST SURGICAL HISTORY         MEDICATIONS:  Antibiotics:    Neuro:  levETIRAcetam  IVPB 250 milliGRAM(s) IV Intermittent every 12 hours    Other:  hydrALAZINE Injectable 5 milliGRAM(s) IV Push once  niCARdipine Infusion 15 mG/Hr IV Continuous <Continuous>  pantoprazole  Injectable 40 milliGRAM(s) IV Push daily  senna 2 Tablet(s) Oral at bedtime PRN  sodium chloride 23.4% Injectable 30 milliLiter(s) IV Push once      SOCIAL HISTORY:   Occupation:   Marital Status:     FAMILY HISTORY:      ROS: Negative except per HPI    LABS:  PT/INR - ( 02 Nov 2021 12:47 )   PT: 13.2 sec;   INR: 1.12 ratio         PTT - ( 02 Nov 2021 12:47 )  PTT:41.2 sec                        8.5    4.33  )-----------( 170      ( 02 Nov 2021 12:47 )             26.3     11-02    140  |  104  |  27<H>  ----------------------------<  116<H>  3.7   |  26  |  5.90<H>    Ca    9.3      02 Nov 2021 12:47  Mg     2.3     11-02    TPro  8.3  /  Alb  3.6  /  TBili  1.0  /  DBili  x   /  AST  8<L>  /  ALT  19  /  AlkPhos  789<H>  11-02

## 2021-11-02 NOTE — H&P ADULT - NSICDXPASTMEDICALHX_GEN_ALL_CORE_FT
PAST MEDICAL HISTORY:  Chronic kidney disease, unspecified CKD stage     HTN (hypertension) sbbp 100-160

## 2021-11-02 NOTE — ED PROVIDER NOTE - OBJECTIVE STATEMENT
51 y.o male was brought in by EMS for a stroke. History provided by EMS. Last known well was around 12:00PM. Patient was at dialysis center earlier today when he started having right sided facial drooping and left sided weakness. EMS states patient was able to communicate without confusion, but had slurred speech. 51 y.o male was brought in by EMS for a stroke. History provided by EMS. Last known well was around 12:00PM. Patient was at dialysis center earlier today when he started having right sided facial drooping and left sided weakness. EMS states patient was diaphoretic, able to communicate without confusion, but had slurred speech.

## 2021-11-02 NOTE — PROGRESS NOTE ADULT - SUBJECTIVE AND OBJECTIVE BOX
SUMMARY:   51 year-old man with history of hypertension and end stage renal disease on hemodialysis was noted to have decrease in mental status and left sided weakness around noon on 11/2/21 whilst at dialysis. He was initially conversant with EMS, but became non-verbal upon arrival to [place] ED where his BP was noted to be 201/86mmhg. He was taken to CT which revealed large right basal ganglia and thalamic hemorrhages with mass effect. He was noted to have a short generalized tonic clonic seizure treated with 2mg lorazepam. He was intubated for airway protection, but was reportedly "fighting" requiring sedation with propofol. He was started on nicardipine gtt for blood pressure control. His reported pre-intubation examination per ED MD was "unresponsive, pupils small but reactive, left sided weakness."    ADMISSION SCORE:  ICH Score 2    24 HOUR EVENTS:  Direct admit to NSCU.     VITALS/DATA/ORDERS: [x] Reviewed SUMMARY:   51 year-old man with history of hypertension and end stage renal disease on hemodialysis was noted to have decrease in mental status and left sided weakness around noon on 11/2/21 whilst at dialysis. He was initially conversant with EMS, but became non-verbal upon arrival to [place] ED where his BP was noted to be 201/86mmhg. He was taken to CT which revealed large right basal ganglia and thalamic hemorrhages with mass effect. He was noted to have a short generalized tonic clonic seizure treated with 2mg lorazepam. He was intubated for airway protection, but was reportedly "fighting" requiring sedation with propofol. He was started on nicardipine gtt for blood pressure control. His reported pre-intubation examination per ED MD was "unresponsive, pupils small but reactive, left sided weakness."    ADMISSION SCORE:  ICH Score 2    24 HOUR EVENTS:  Direct admit to NSCU. Arrived with b/l blown pupils. SBP 130s on nicardipine with HR in 40s. Temp 94 degrees. Head at 30 degrees. Hyperventilated. Given 30cc 23.4%NaCl. Emergent central line placed. Per NSGY Dr. Prakash, no surgery offered, suggested Palliative Care.     VITALS/DATA/ORDERS: [x] Reviewed    EXAMINATION:   No eye opening, fixed and dilated pupils b/l, absent oculocephalic reflex, absent corneals, +cough/gag, no movement in arms, L>R LE TF SUMMARY:   51 year-old man with history of hypertension and end stage renal disease on hemodialysis was noted to have decrease in mental status and left sided weakness around noon on 11/2/21 whilst at dialysis. He was initially conversant with EMS, but became non-verbal upon arrival to [place] ED where his BP was noted to be 201/86mmhg. He was taken to CT which revealed large right basal ganglia and thalamic hemorrhages with mass effect. He was noted to have a short generalized tonic clonic seizure treated with 2mg lorazepam. He was intubated for airway protection, but was reportedly "fighting" requiring sedation with propofol. He was started on nicardipine gtt for blood pressure control. His reported pre-intubation examination per ED MD was "unresponsive, pupils small but reactive, left sided weakness."    ADMISSION SCORE:  ICH Score 2    24 HOUR EVENTS:  Direct admit to NSCU. Arrived with b/l blown pupils. SBP 130s on nicardipine with HR in 40s. Temp 94 degrees. Head at 30 degrees. Hyperventilated. Given 30cc 23.4%NaCl. Emergent central line placed for hypertonic saline, unable to reach family at the time for consent. Per NSGY Dr. Prakash, no surgery offered, suggested Palliative Care.     VITALS/DATA/ORDERS: [x] Reviewed    EXAMINATION:   No eye opening, fixed and dilated pupils b/l, absent oculocephalic reflex, absent corneals, +cough/gag, no movement in arms, L>R LE TF

## 2021-11-02 NOTE — ED ADULT NURSE NOTE - NSIMPLEMENTINTERV_GEN_ALL_ED
Pt pacing the room, refuses monitors at this time    Implemented All Fall Risk Interventions:  [employer] to call system. Call bell, personal items and telephone within reach. Instruct patient to call for assistance. Room bathroom lighting operational. Non-slip footwear when patient is off stretcher. Physically safe environment: no spills, clutter or unnecessary equipment. Stretcher in lowest position, wheels locked, appropriate side rails in place. Provide visual cue, wrist band, yellow gown, etc. Monitor gait and stability. Monitor for mental status changes and reorient to person, place, and time. Review medications for side effects contributing to fall risk. Reinforce activity limits and safety measures with patient and family.

## 2021-11-02 NOTE — ED PROVIDER NOTE - NSICDXPASTMEDICALHX_GEN_ALL_CORE_FT
PAST MEDICAL HISTORY:  Chronic kidney disease, unspecified CKD stage      PAST MEDICAL HISTORY:  HTN (hypertension)       Chronic kidney disease, unspecified CKD stage

## 2021-11-02 NOTE — ED ADULT TRIAGE NOTE - CHIEF COMPLAINT QUOTE
BIBA as notification for slurred speech with right facial droop and left arm weakness, noted today at dialysis center around 1150Am

## 2021-11-02 NOTE — ED PROVIDER NOTE - NSSTROKETPAEXCLABS_INTRACRAN
Evidence of intracranial hemorrhage on pretreatment CT scan (CT Scan must be read by attending radiologist or attending neurologist)

## 2021-11-02 NOTE — H&P ADULT - HISTORY OF PRESENT ILLNESS
51 year-old man with history of hypertension and end stage renal disease on hemodialysis was noted to have decrease in mental status and left sided weakness around noon on 11/2/21 whilst at dialysis. He was initially conversant with EMS, but became non-verbal upon arrival to [place] ED where his BP was noted to be 201/86mmhg. He was taken to CT which revealed large right basal ganglia and thalamic hemorrhages with mass effect. He was noted to have a short generalized tonic clonic seizure treated with 2mg lorazepam. He was intubated for airway protection, but was reportedly "fighting" requiring sedation with propofol. He was started on nicardipine gtt for blood pressure control. His reported pre-intubation examination per ED MD was "unresponsive, pupils small but reactive, left sided weakness."    ADMISSION SCORE:  ICH Score 2

## 2021-11-02 NOTE — DISCHARGE NOTE FOR THE EXPIRED PATIENT - HOSPITAL COURSE
51 year-old man with history of hypertension and end stage renal disease on hemodialysis was noted to have decrease in mental status and left sided weakness around noon on 11/2/21 whilst at dialysis. He was initially conversant with EMS, but became non-verbal upon arrival to [place] ED where his BP was noted to be 201/86mmhg. He was taken to CT which revealed large right basal ganglia and thalamic hemorrhages with mass effect. He was noted to have a short generalized tonic clonic seizure treated with 2mg lorazepam. He was intubated for airway protection, but was reportedly "fighting" requiring sedation with propofol. He was started on nicardipine gtt for blood pressure control. His reported pre-intubation examination per ED MD was "unresponsive, pupils small but reactive, left sided weakness." Patient received 23.4% two times for symptomatic brain compression. While in the NSCU patient's exam deteriorated further and  51 year-old man with history of hypertension and end stage renal disease on hemodialysis was noted to have decrease in mental status and left sided weakness around noon on 11/2/21 whilst at dialysis. He was initially conversant with EMS, but became non-verbal upon arrival to [place] ED where his BP was noted to be 201/86mmhg. He was taken to CT which revealed large right basal ganglia and thalamic hemorrhages with mass effect. He was noted to have a short generalized tonic clonic seizure treated with 2mg lorazepam. He was intubated for airway protection, but was reportedly "fighting" requiring sedation with propofol. He was started on nicardipine gtt for blood pressure control. His reported pre-intubation examination per ED MD was "unresponsive, pupils small but reactive, left sided weakness." Patient received 23.4% two times for symptomatic brain compression. While in the NSCU patient's exam deteriorated further and goals of care discussion was initiated with family. Family agreed to comfort measure, DNR/I and compassionate extubation on 11/3/21.  51 year-old man with history of hypertension and end stage renal disease on hemodialysis was noted to have decrease in mental status and left sided weakness around noon on 11/2/21 whilst at dialysis. He was initially conversant with EMS, but became non-verbal upon arrival to [place] ED where his BP was noted to be 201/86mmhg. He was taken to CT which revealed large right basal ganglia and thalamic hemorrhages with mass effect. He was noted to have a short generalized tonic clonic seizure treated with 2mg lorazepam. He was intubated for airway protection, but was reportedly "fighting" requiring sedation with propofol. He was started on nicardipine gtt for blood pressure control. His reported pre-intubation examination per ED MD was "unresponsive, pupils small but reactive, left sided weakness." Patient received 23.4% two times for symptomatic brain compression. While in the NSCU patient's exam deteriorated further and goals of care discussion was initiated with family. Family agreed to comfort measure, DNR/I and compassionate extubation on 11/3/21. Patient was compassionately extubated at 1:19am and cardiopulmonary arrested shortly after. Patient was declared dead at 1:34am on 11/3/21.

## 2021-11-02 NOTE — H&P ADULT - ASSESSMENT
51 yr old male ESRD with left basal ganglia hemorrhage with poor prognosis  - admit to NSCU  - no surgery per neurology  -neuro and vital checks q 1   - consulted Renal  - pallative consult  - 23 percent

## 2021-11-02 NOTE — H&P ADULT - NSHPPHYSICALEXAM_GEN_ALL_CORE
No eye opening, fixed and dilated pupils b/l, absent oculocephalic reflex, absent corneals, +cough/gag, no movement in arms, L>R LE TF

## 2021-11-03 LAB
T4 FREE SERPL-MCNC: 1 NG/DL — SIGNIFICANT CHANGE UP (ref 0.9–1.8)
TSH SERPL-MCNC: 3.31 UIU/ML — SIGNIFICANT CHANGE UP (ref 0.27–4.2)

## 2021-11-03 PROCEDURE — 36415 COLL VENOUS BLD VENIPUNCTURE: CPT

## 2021-11-03 PROCEDURE — 85730 THROMBOPLASTIN TIME PARTIAL: CPT

## 2021-11-03 PROCEDURE — 83735 ASSAY OF MAGNESIUM: CPT

## 2021-11-03 PROCEDURE — 93005 ELECTROCARDIOGRAM TRACING: CPT

## 2021-11-03 PROCEDURE — 84439 ASSAY OF FREE THYROXINE: CPT

## 2021-11-03 PROCEDURE — 86900 BLOOD TYPING SEROLOGIC ABO: CPT

## 2021-11-03 PROCEDURE — 85027 COMPLETE CBC AUTOMATED: CPT

## 2021-11-03 PROCEDURE — 82803 BLOOD GASES ANY COMBINATION: CPT

## 2021-11-03 PROCEDURE — 80048 BASIC METABOLIC PNL TOTAL CA: CPT

## 2021-11-03 PROCEDURE — 70450 CT HEAD/BRAIN W/O DYE: CPT

## 2021-11-03 PROCEDURE — 85610 PROTHROMBIN TIME: CPT

## 2021-11-03 PROCEDURE — 71045 X-RAY EXAM CHEST 1 VIEW: CPT

## 2021-11-03 PROCEDURE — 86850 RBC ANTIBODY SCREEN: CPT

## 2021-11-03 PROCEDURE — 86901 BLOOD TYPING SEROLOGIC RH(D): CPT

## 2021-11-03 PROCEDURE — 84443 ASSAY THYROID STIM HORMONE: CPT

## 2021-11-03 PROCEDURE — 84100 ASSAY OF PHOSPHORUS: CPT

## 2021-11-03 PROCEDURE — 94002 VENT MGMT INPAT INIT DAY: CPT

## 2021-11-03 PROCEDURE — 84484 ASSAY OF TROPONIN QUANT: CPT

## 2021-11-03 RX ORDER — MORPHINE SULFATE 50 MG/1
2 CAPSULE, EXTENDED RELEASE ORAL
Refills: 0 | Status: DISCONTINUED | OUTPATIENT
Start: 2021-11-03 | End: 2021-11-03

## 2021-11-03 RX ORDER — ROBINUL 0.2 MG/ML
0.2 INJECTION INTRAMUSCULAR; INTRAVENOUS EVERY 4 HOURS
Refills: 0 | Status: DISCONTINUED | OUTPATIENT
Start: 2021-11-03 | End: 2021-11-03

## 2021-11-03 RX ADMIN — MORPHINE SULFATE 2 MILLIGRAM(S): 50 CAPSULE, EXTENDED RELEASE ORAL at 01:20

## 2021-11-03 NOTE — AIRWAY REMOVAL NOTE  ADULT & PEDS - ARTIFICAL AIRWAY REMOVAL COMMENTS
Written order for extubation verified. The patient was identified by full name and birth date compared to the identification band. Present during the procedure was MICHEL Briseno

## 2021-11-03 NOTE — CHART NOTE - NSCHARTNOTEFT_GEN_A_CORE
DEATH NOTE    Called to bedside to evaluate the patient for asystole.     On physical exam, patient did not respond to verbal or noxious stimuli.  No spontaneous respirations.  Absent heart and breath sounds.  Absent radial pulses. Pupils are fixed and dilated. TELE shows asystole. Patient pronounced dead at 1:34 am by attending. Family at bedside, they declined autopsy.    Danielle Kenny  Neurocritical Care Attending

## 2021-12-03 RX ORDER — LABETALOL HCL 100 MG
1 TABLET ORAL
Qty: 0 | Refills: 0 | DISCHARGE

## 2021-12-03 RX ORDER — CHOLECALCIFEROL (VITAMIN D3) 125 MCG
1 CAPSULE ORAL
Qty: 0 | Refills: 0 | DISCHARGE

## 2021-12-03 RX ORDER — HYDRALAZINE HCL 50 MG
1 TABLET ORAL
Qty: 0 | Refills: 0 | DISCHARGE

## 2021-12-03 RX ORDER — DARBEPOETIN ALFA IN POLYSORBAT 200MCG/0.4
40 PEN INJECTOR (ML) SUBCUTANEOUS
Qty: 0 | Refills: 0 | DISCHARGE

## 2021-12-03 RX ORDER — AMLODIPINE BESYLATE 2.5 MG/1
1 TABLET ORAL
Qty: 0 | Refills: 0 | DISCHARGE

## 2021-12-03 RX ORDER — IRON SUCROSE 20 MG/ML
100 INJECTION, SOLUTION INTRAVENOUS
Qty: 0 | Refills: 0 | DISCHARGE